# Patient Record
(demographics unavailable — no encounter records)

---

## 2025-05-14 NOTE — HISTORY OF PRESENT ILLNESS
[de-identified] : 05/12/2025: 65 Y F presenting today for an initial evaluation. Chronic HX of back pains. Lower back pains exacerbated 5-6 weeks ago.  Most recent episode is different compared to prior, previously she reports back pain with FF. NKI but may be attributed to swimming. She reports from recent episode difficult to extend, radiating pains to buttock, w/ N/T in BL LE. Her pains are improving. Walking tolerance is about 20 minutes, previously was able to walk 4-5 miles a few months prior.  Treating with OTC Aleve. No constitutional symptoms. Thyroid nodule from her last physical. Pt stays active with [employer].   The patient is a 65 year female who presents today complaining of low back pain radiating down both legs Date of Injury/Onset: Chronic, but flared up 6 weeks ago Pain:    At Rest: 6/10  With Activity:  7/10  Mechanism of injury: NKI Quality of symptoms: sharp at times, mostly achy, tingling in legs Improves with: sitting Worse with: prolonged walking, standing, bending backwards Prior treatment: heat, Aleve, physical therapy has helped in the past Prior Imaging: Imaging at [facility] from 5 years ago Additional Information:

## 2025-05-14 NOTE — DATA REVIEWED
[FreeTextEntry1] : On my interpretations of these images from [facility] in house on 05/12/2025: I have independently reviewed and interpreted these images. 2 V lumbar XR- scoliotic deformity of lumbar spine, measuring 20 degrees L1-5, mod multilevel DDD L2-S1.   Lumbar MRI from 2/14/2020 from pt's [facility] My chart. Revealed moderate DDD L2-S1, rotation consistent w/ scoliosis & mild stenosis.

## 2025-05-14 NOTE — PHYSICAL EXAM
[de-identified] : Constitutional: - General Appearance: Unremarkable Body Habitus Well Developed Well Nourished Body Habitus No Deformities Well Groomed Ability To communicate: Normal Neurologic: Global sensation is intact to upper and lower extremities. See examination of Neck and/or Spine for exceptions. Orientation to Time, Place and Person is: Normal Mood And Affect is Normal Skin: - Head/Face, Right Upper/Lower Extremity, Left Upper/Lower Extremity: Normal See Examination of Neck and/or Spine for exceptions Cardiovascular: Peripheral Cardiovascular System is Normal Palpation of Lymph Nodes: Normal Palpation of lymph nodes in: Axilla, Cervical, Inguinal Abnormal Palpation of lymph nodes in: None  [] : non-antalgic [FreeTextEntry9] : R sided bending causes L sided pain.  [de-identified] : 1 beat of clonus BL.

## 2025-05-14 NOTE — HISTORY OF PRESENT ILLNESS
[de-identified] : 05/12/2025: 65 Y F presenting today for an initial evaluation. Chronic HX of back pains. Lower back pains exacerbated 5-6 weeks ago.  Most recent episode is different compared to prior, previously she reports back pain with FF. NKI but may be attributed to swimming. She reports from recent episode difficult to extend, radiating pains to buttock, w/ N/T in BL LE. Her pains are improving. Walking tolerance is about 20 minutes, previously was able to walk 4-5 miles a few months prior.  Treating with OTC Aleve. No constitutional symptoms. Thyroid nodule from her last physical. Pt stays active with [employer].   The patient is a 65 year female who presents today complaining of low back pain radiating down both legs Date of Injury/Onset: Chronic, but flared up 6 weeks ago Pain:    At Rest: 6/10  With Activity:  7/10  Mechanism of injury: NKI Quality of symptoms: sharp at times, mostly achy, tingling in legs Improves with: sitting Worse with: prolonged walking, standing, bending backwards Prior treatment: heat, Aleve, physical therapy has helped in the past Prior Imaging: Imaging at [facility] from 5 years ago Additional Information:

## 2025-05-14 NOTE — DISCUSSION/SUMMARY
[de-identified] : 65 Y F w/ degenerative scoliosis and acute exacerbation of chronic lower back pains. In house XRs reviewed & discussed with patient today & also reviewed 2020 lumbar MRI. Episode is improving. Patient was provided with a referral for lumbar physical therapy to work on stretching, strengthening and range of motion. Patient was provided with a lumbar home exercise program. Treat w/ OTC NSAIDs PRN.   Moving forward I'd like to see as needed.  Prior to appointment and during encounter with patient extensive medical records were reviewed including but not limited to, hospital records, out patient records, imaging results, and lab data. During this appointment the patient was examined, diagnoses were discussed and explained in a face to face manner. In addition extensive time was spent reviewing aforementioned diagnostic studies. Counseling including abnormal image results, differential diagnoses, treatment options, risk and benefits, lifestyle changes, current condition, and current medications was performed. Patient's comments, questions, and concerns were address and patient verbalized understanding. Based on this patient's presentation at our office, which is an orthopedic spine surgeon's office, this patient inherently / intrinsically has a risk, however minute, of developing issues such as Cauda equina syndrome, bowel and bladder changes, or progression of motor or neurological deficits such as paralysis which may be permanent.    I, Candis Gatica, attest that this documentation has been prepared under the direction and in the presence of provider Seth Kumar MD.

## 2025-05-14 NOTE — DATA REVIEWED
[FreeTextEntry1] : On my interpretations of these images from [facility] in house on 05/12/2025: I have independently reviewed and interpreted these images. 2 V lumbar XR- scoliotic deformity of lumbar spine, measuring 20 degrees L1-5, mod multilevel DDD L2-S1.   Lumbar MRI from 2/14/2020 from pt's [facility] My chart. Revealed moderate DDD L2-S1, rotation consistent w/ scoliosis & mild stenosis.

## 2025-05-14 NOTE — DISCUSSION/SUMMARY
[de-identified] : 65 Y F w/ degenerative scoliosis and acute exacerbation of chronic lower back pains. In house XRs reviewed & discussed with patient today & also reviewed 2020 lumbar MRI. Episode is improving. Patient was provided with a referral for lumbar physical therapy to work on stretching, strengthening and range of motion. Patient was provided with a lumbar home exercise program. Treat w/ OTC NSAIDs PRN.   Moving forward I'd like to see as needed.  Prior to appointment and during encounter with patient extensive medical records were reviewed including but not limited to, hospital records, out patient records, imaging results, and lab data. During this appointment the patient was examined, diagnoses were discussed and explained in a face to face manner. In addition extensive time was spent reviewing aforementioned diagnostic studies. Counseling including abnormal image results, differential diagnoses, treatment options, risk and benefits, lifestyle changes, current condition, and current medications was performed. Patient's comments, questions, and concerns were address and patient verbalized understanding. Based on this patient's presentation at our office, which is an orthopedic spine surgeon's office, this patient inherently / intrinsically has a risk, however minute, of developing issues such as Cauda equina syndrome, bowel and bladder changes, or progression of motor or neurological deficits such as paralysis which may be permanent.    I, Candis Gatica, attest that this documentation has been prepared under the direction and in the presence of provider Seth Kumar MD.

## 2025-05-14 NOTE — PHYSICAL EXAM
[de-identified] : Constitutional: - General Appearance: Unremarkable Body Habitus Well Developed Well Nourished Body Habitus No Deformities Well Groomed Ability To communicate: Normal Neurologic: Global sensation is intact to upper and lower extremities. See examination of Neck and/or Spine for exceptions. Orientation to Time, Place and Person is: Normal Mood And Affect is Normal Skin: - Head/Face, Right Upper/Lower Extremity, Left Upper/Lower Extremity: Normal See Examination of Neck and/or Spine for exceptions Cardiovascular: Peripheral Cardiovascular System is Normal Palpation of Lymph Nodes: Normal Palpation of lymph nodes in: Axilla, Cervical, Inguinal Abnormal Palpation of lymph nodes in: None  [] : non-antalgic [FreeTextEntry9] : R sided bending causes L sided pain.  [de-identified] : 1 beat of clonus BL.